# Patient Record
(demographics unavailable — no encounter records)

---

## 2020-06-26 NOTE — NUR
PT [facility] AND [employer] FROM STREET C/O LAC R TOE AND ALCOHOL INTOXICATION. PT 
APPEARS INTOXICATED ON ARRIVAL. PT AAOX4. RESPIRATIONS EVEN AND UNLABORED. 
VITAL SIGNS STABLE. NO ACUTE DISTRESS NOTED AT THIS TIME. WILL CONTINUE TO 
MONITOR

## 2020-06-26 NOTE — NUR
FOOD AND WATER PROVIDED. PT AWAKE, RESTING COMFORTABLY IN BED. VSS. NO ACUTE 
DISTRESS NOTED. SITTER AT BEDSIDE FOR SAFETY

## 2020-06-26 NOTE — NUR
pt appears sober, able to walk with steady gait. provided food and drink. able 
to speak in complete sentences.